# Patient Record
Sex: FEMALE | Race: WHITE
[De-identification: names, ages, dates, MRNs, and addresses within clinical notes are randomized per-mention and may not be internally consistent; named-entity substitution may affect disease eponyms.]

---

## 2017-10-31 ENCOUNTER — HOSPITAL ENCOUNTER (OUTPATIENT)
Dept: HOSPITAL 53 - M LRY | Age: 32
End: 2017-10-31
Attending: NURSE PRACTITIONER
Payer: SELF-PAY
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
## 2017-10-31 DIAGNOSIS — S42.255A: Primary | ICD-10-CM
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
## 2017-10-31 NOTE — REP
Left shoulder three views:

 

There is a nondisplaced fracture at the base of the humeral tuberosities.

 

There is no dislocation.  Mineralization is normal.  The acromioclavicular

glenohumeral articulations are unremarkable.

 

Impression:

 

Nondisplaced fracture at the base of the humeral tuberosities.

 

 

Signed by

Rosendo Zavala MD 10/31/2017 10:28 A

## 2020-12-14 ENCOUNTER — HOSPITAL ENCOUNTER (OUTPATIENT)
Dept: HOSPITAL 53 - M LABSMTC | Age: 35
End: 2020-12-14
Attending: PEDIATRICS
Payer: SELF-PAY

## 2020-12-14 DIAGNOSIS — Z20.828: Primary | ICD-10-CM

## 2022-06-23 ENCOUNTER — HOSPITAL ENCOUNTER (OUTPATIENT)
Dept: HOSPITAL 53 - M RAD | Age: 37
End: 2022-06-23
Attending: PHYSICIAN ASSISTANT
Payer: MEDICAID

## 2022-06-23 DIAGNOSIS — Z32.01: Primary | ICD-10-CM

## 2025-02-04 ENCOUNTER — HOSPITAL ENCOUNTER (OUTPATIENT)
Dept: HOSPITAL 53 - M OUTALCOH | Age: 40
End: 2025-02-04
Attending: PSYCHIATRY & NEUROLOGY
Payer: MEDICAID

## 2025-02-04 DIAGNOSIS — Z03.89: Primary | ICD-10-CM

## 2025-02-04 DIAGNOSIS — F17.200: ICD-10-CM

## 2025-02-06 ENCOUNTER — HOSPITAL ENCOUNTER (OUTPATIENT)
Dept: HOSPITAL 53 - M OUTALCOH | Age: 40
LOS: 22 days | End: 2025-02-28
Attending: PSYCHIATRY & NEUROLOGY
Payer: COMMERCIAL

## 2025-02-06 DIAGNOSIS — F17.200: ICD-10-CM

## 2025-02-06 DIAGNOSIS — Z03.89: Primary | ICD-10-CM
